# Patient Record
Sex: MALE | Race: WHITE
[De-identification: names, ages, dates, MRNs, and addresses within clinical notes are randomized per-mention and may not be internally consistent; named-entity substitution may affect disease eponyms.]

---

## 2020-01-06 ENCOUNTER — HOSPITAL ENCOUNTER (EMERGENCY)
Dept: HOSPITAL 72 - EMR | Age: 30
Discharge: HOME | End: 2020-01-06
Payer: COMMERCIAL

## 2020-01-06 VITALS — HEIGHT: 72 IN | BODY MASS INDEX: 22.75 KG/M2 | WEIGHT: 168 LBS

## 2020-01-06 VITALS — SYSTOLIC BLOOD PRESSURE: 142 MMHG | DIASTOLIC BLOOD PRESSURE: 94 MMHG

## 2020-01-06 VITALS — DIASTOLIC BLOOD PRESSURE: 94 MMHG | SYSTOLIC BLOOD PRESSURE: 142 MMHG
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
## 2020-01-06 DIAGNOSIS — S01.81XA: Primary | ICD-10-CM
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
## 2020-01-06 PROCEDURE — 70450 CT HEAD/BRAIN W/O DYE: CPT

## 2020-01-06 PROCEDURE — 99284 EMERGENCY DEPT VISIT MOD MDM: CPT

## 2020-01-06 PROCEDURE — 70486 CT MAXILLOFACIAL W/O DYE: CPT

## 2020-01-06 NOTE — NUR
ER DISCHARGE NOTE:



Patient is cleared to be discharged per ERMD, pt is aox4, on room air, with 
stable vital signs. pt was given dc and prescription instructions, pt was able 
to verbalize understanding. pt's laceration was sutured by ERMD, clean and 
dressed. pt id band removed without complications. pt is able to ambulate with 
steady gait. pt took all belongings.
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
## 2020-01-13 ENCOUNTER — HOSPITAL ENCOUNTER (EMERGENCY)
Dept: HOSPITAL 72 - EMR | Age: 30
Discharge: HOME | End: 2020-01-13
Payer: COMMERCIAL

## 2020-01-13 VITALS — SYSTOLIC BLOOD PRESSURE: 132 MMHG | DIASTOLIC BLOOD PRESSURE: 84 MMHG

## 2020-01-13 VITALS — WEIGHT: 168 LBS | HEIGHT: 72 IN | BODY MASS INDEX: 22.75 KG/M2

## 2020-01-13 DIAGNOSIS — Z48.02: Primary | ICD-10-CM

## 2020-01-13 DIAGNOSIS — G40.909: ICD-10-CM

## 2020-01-13 PROCEDURE — 99281 EMR DPT VST MAYX REQ PHY/QHP: CPT
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
## 2020-01-13 NOTE — NUR
ER DISCHARGE NOTE:

Pt is cleared to be discharge per ERMD,. Pt is AOx4, on RA, VSS. pt was given 
dc and prescription instructions, pt was able to verbalize understanding, pt id 
band removed. pt is able to ambulate with steady gait. Pt left ER with all 
belongings.

## 2020-01-13 NOTE — NUR
ED Nurse Note:



Pt ambulated to ed for stiches removal. Pt is AOx4 no signs of distress. Placed 
on bed.